# Patient Record
(demographics unavailable — no encounter records)

---

## 2025-04-14 NOTE — PHYSICAL EXAM
[de-identified] : Constitutional: Well-nourished, well-developed, No acute distress Respiratory:  Good respiratory effort, no SOB Lymphatic: No regional lymphadenopathy, no lymphedema Psychiatric: Pleasant and normal affect, alert and oriented x3 Musculoskeletal: normal except where as noted in regional exam  Bilateral hips:  APPEARANCE: no marked deformities, no swelling or malalignment POSITIVE TENDERNESS: Hip flexor region, distal iliopsoas, proximal rectus femoris NONTENDER: greater trochanter, TFL, gluteal region, ischium/proximal hamstring region, and pubic symphysis.  ROM: full & painless.  RESISTIVE TESTING: Moderate pain with resisted hip flexion, more painful with resisted SLR, otherwise painless resisted extension, ER/IR/abduction/adduction.  SPECIAL TESTS: neg OMKAR/FADIR, painless loaded flexion & scouring

## 2025-04-14 NOTE — DISCUSSION/SUMMARY
[de-identified] : Discussed findings of today's exam and possible causes of patient's pain.  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  Reviewed possible courses of treatment, and we collaboratively decided best course of treatment at this time will include conservative management.  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  I would recommend 3 weeks of cessation of basketball activity, he plays year-round, it is likely this overuse repetitive activity that led to this pain.  Patient is quite tall for his age, his mother states he had a significant growth spurt in the last year, this is likely etiology of his anterior hip pain.  Recommend follow-up in 3 weeks for reassessment to determine if he can return to basketball at that time.  Patient and his mother appreciate and agree with current plan.  This note was generated using dragon medical dictation software.  A reasonable effort has been made for proofreading its contents, but typos may still remain.  If there are any questions or points of clarification needed please notify my office.

## 2025-04-14 NOTE — HISTORY OF PRESENT ILLNESS
[de-identified] : 11 yo male with b/l hip pain. 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 He usually has 2-4 practices a week. He states 1 month he started experiencing  b/l hip pain with any movement of his hips. His mother states he is so winded from his pain. Denies taking any NSAIDS, denies having any images